# Patient Record
Sex: FEMALE | Race: WHITE | NOT HISPANIC OR LATINO | URBAN - METROPOLITAN AREA
[De-identification: names, ages, dates, MRNs, and addresses within clinical notes are randomized per-mention and may not be internally consistent; named-entity substitution may affect disease eponyms.]

---

## 2018-09-17 ENCOUNTER — OFFICE VISIT (OUTPATIENT)
Dept: URBAN - METROPOLITAN AREA CLINIC 45 | Facility: CLINIC | Age: 73
End: 2018-09-17
Payer: COMMERCIAL

## 2018-09-17 DIAGNOSIS — H35.371 PUCKERING OF MACULA, RIGHT EYE: Primary | ICD-10-CM

## 2018-09-17 PROCEDURE — 99213 OFFICE O/P EST LOW 20 MIN: CPT | Performed by: OPHTHALMOLOGY

## 2018-09-17 PROCEDURE — 92134 CPTRZ OPH DX IMG PST SGM RTA: CPT | Performed by: OPHTHALMOLOGY

## 2018-09-17 RX ORDER — OFLOXACIN 3 MG/ML
0.3 % SOLUTION/ DROPS OPHTHALMIC
Qty: 1 | Refills: 1 | Status: INACTIVE
Start: 2018-09-17 | End: 2019-02-12

## 2018-09-17 RX ORDER — PREDNISOLONE ACETATE 10 MG/ML
1 % SUSPENSION/ DROPS OPHTHALMIC
Qty: 10 | Refills: 2 | Status: INACTIVE
Start: 2018-09-17 | End: 2019-02-12

## 2018-09-17 ASSESSMENT — INTRAOCULAR PRESSURE
OD: 15
OS: 13

## 2018-09-17 NOTE — IMPRESSION/PLAN
Impression: Puckering of macula, right eye: H35.371. OD. Condition: established, worsening. Plan: OCT ordered and performed today. Discussed diagnosis with patient. The clinical exam was consistent with a visual significant Epiretinal membrane. The diagnosis and natural history and prognosis of Epiretinal membrane. Recommend sx OD, after a through discussion of surgical R/B/A. The patient understands the potential risks of sx, including (but not limited to) bleeding, pain, infection, loss of vision, loss of eye and possible need for more sx. The patient elects to proceed with sx, fully understanding that while the symptoms of visual distortion should improve, the final acuity which can take months to achieve may or may not be an improvement over baseline.  RL-2

## 2018-10-16 ENCOUNTER — SURGERY (OUTPATIENT)
Dept: URBAN - METROPOLITAN AREA SURGERY 15 | Facility: SURGERY | Age: 73
End: 2018-10-16
Payer: COMMERCIAL

## 2018-10-16 PROCEDURE — 67042 VIT FOR MACULAR HOLE: CPT | Performed by: OPHTHALMOLOGY

## 2018-10-17 ENCOUNTER — POST-OPERATIVE VISIT (OUTPATIENT)
Dept: URBAN - METROPOLITAN AREA CLINIC 45 | Facility: CLINIC | Age: 73
End: 2018-10-17

## 2018-10-17 ASSESSMENT — INTRAOCULAR PRESSURE
OD: 12
OS: 14

## 2018-10-23 ENCOUNTER — POST-OPERATIVE VISIT (OUTPATIENT)
Dept: URBAN - METROPOLITAN AREA CLINIC 33 | Facility: CLINIC | Age: 73
End: 2018-10-23

## 2018-10-23 PROCEDURE — 99024 POSTOP FOLLOW-UP VISIT: CPT | Performed by: OPHTHALMOLOGY

## 2018-10-23 ASSESSMENT — INTRAOCULAR PRESSURE
OS: 11
OD: 11

## 2018-11-20 ENCOUNTER — POST-OPERATIVE VISIT (OUTPATIENT)
Dept: URBAN - METROPOLITAN AREA CLINIC 33 | Facility: CLINIC | Age: 73
End: 2018-11-20
Payer: COMMERCIAL

## 2018-11-20 PROCEDURE — 99024 POSTOP FOLLOW-UP VISIT: CPT | Performed by: OPHTHALMOLOGY

## 2018-11-20 ASSESSMENT — INTRAOCULAR PRESSURE
OS: 14
OD: 16

## 2018-12-18 ENCOUNTER — POST-OPERATIVE VISIT (OUTPATIENT)
Dept: URBAN - METROPOLITAN AREA CLINIC 33 | Facility: CLINIC | Age: 73
End: 2018-12-18

## 2018-12-18 DIAGNOSIS — Z09 ENCNTR FOR F/U EXAM AFT TRTMT FOR COND OTH THAN MALIG NEOPLM: Primary | ICD-10-CM

## 2018-12-18 PROCEDURE — 99024 POSTOP FOLLOW-UP VISIT: CPT | Performed by: OPHTHALMOLOGY

## 2018-12-18 ASSESSMENT — INTRAOCULAR PRESSURE
OD: 14
OS: 14

## 2019-02-12 ENCOUNTER — OFFICE VISIT (OUTPATIENT)
Dept: URBAN - METROPOLITAN AREA CLINIC 33 | Facility: CLINIC | Age: 74
End: 2019-02-12
Payer: COMMERCIAL

## 2019-02-12 PROCEDURE — 92134 CPTRZ OPH DX IMG PST SGM RTA: CPT | Performed by: OPHTHALMOLOGY

## 2019-02-12 PROCEDURE — 99213 OFFICE O/P EST LOW 20 MIN: CPT | Performed by: OPHTHALMOLOGY

## 2019-02-12 ASSESSMENT — INTRAOCULAR PRESSURE
OS: 14
OD: 18

## 2019-02-22 ENCOUNTER — OFFICE VISIT (OUTPATIENT)
Dept: URBAN - METROPOLITAN AREA CLINIC 33 | Facility: CLINIC | Age: 74
End: 2019-02-22

## 2019-02-22 DIAGNOSIS — H52.4 PRESBYOPIA: Primary | ICD-10-CM

## 2019-02-22 PROCEDURE — 92012 INTRM OPH EXAM EST PATIENT: CPT | Performed by: OPTOMETRIST

## 2019-02-22 ASSESSMENT — INTRAOCULAR PRESSURE
OS: 13
OD: 15

## 2019-02-22 ASSESSMENT — VISUAL ACUITY
OS: 20/20
OD: 20/60

## 2019-02-22 NOTE — IMPRESSION/PLAN
Impression: Presbyopia: H52.4. Plan: Educated patient about today's findings. Order refraction to determine patient's best corrected visual acuity as it relates to presbyopia- dispensed Rx to patient. Patient was educated about 1-2 week adaptation period with new Rx. 

Decreased BCVA OD due to macular pucker OD

## 2019-06-19 ENCOUNTER — Encounter (OUTPATIENT)
Dept: URBAN - METROPOLITAN AREA CLINIC 71 | Facility: CLINIC | Age: 74
End: 2019-06-19
Payer: MEDICARE

## 2019-06-19 PROCEDURE — 92250 FUNDUS PHOTOGRAPHY W/I&R: CPT | Performed by: OPHTHALMOLOGY

## 2019-06-19 PROCEDURE — 99215 OFFICE O/P EST HI 40 MIN: CPT | Performed by: OPHTHALMOLOGY

## 2019-07-09 ENCOUNTER — Encounter (OUTPATIENT)
Dept: URBAN - METROPOLITAN AREA CLINIC 71 | Facility: CLINIC | Age: 74
End: 2019-07-09

## 2019-07-30 ENCOUNTER — OFFICE VISIT (OUTPATIENT)
Dept: URBAN - METROPOLITAN AREA CLINIC 33 | Facility: CLINIC | Age: 74
End: 2019-07-30
Payer: MEDICARE

## 2019-07-30 DIAGNOSIS — H35.81 RETINAL EDEMA: Primary | ICD-10-CM

## 2019-07-30 PROCEDURE — 92134 CPTRZ OPH DX IMG PST SGM RTA: CPT | Performed by: OPHTHALMOLOGY

## 2019-07-30 PROCEDURE — 99213 OFFICE O/P EST LOW 20 MIN: CPT | Performed by: OPHTHALMOLOGY

## 2019-07-30 ASSESSMENT — INTRAOCULAR PRESSURE
OD: 16
OS: 15

## 2019-07-30 NOTE — IMPRESSION/PLAN
Impression: Retinal edema: H35.81.
s/p PPVx W/ ERMx Plan: OCT ordered and performed today, Discussed diagnosis in detail with patient. Discussed treatment options with patient. Reassured patient of current condition and treatment. Will continue to observe condition and or symptoms. Call if [address] worsens.

## 2020-09-24 ENCOUNTER — OFFICE VISIT (OUTPATIENT)
Dept: URBAN - METROPOLITAN AREA CLINIC 71 | Facility: CLINIC | Age: 75
End: 2020-09-24
Payer: MEDICARE

## 2020-09-24 DIAGNOSIS — H00.15 CHALAZION LEFT LOWER EYELID: Primary | ICD-10-CM

## 2020-09-24 PROCEDURE — 92012 INTRM OPH EXAM EST PATIENT: CPT | Performed by: OPHTHALMOLOGY

## 2020-09-24 RX ORDER — TOBRAMYCIN AND DEXAMETHASONE 3; 1 MG/G; MG/G
OINTMENT OPHTHALMIC
Qty: 1 | Refills: 0 | Status: INACTIVE
Start: 2020-09-24 | End: 2020-09-24

## 2020-09-24 ASSESSMENT — INTRAOCULAR PRESSURE
OS: 21
OD: 20

## 2020-09-24 NOTE — IMPRESSION/PLAN
Impression: Chalazion left lower eyelid: H00.15. Plan: Will start patient on tobradex ointment BID for 10-14 days in left eye. Patient can also use warm compresses. Patient instructed to call if symptoms persist or worsen despite treatment.

## 2020-10-23 ENCOUNTER — OFFICE VISIT (OUTPATIENT)
Dept: URBAN - METROPOLITAN AREA CLINIC 33 | Facility: CLINIC | Age: 75
End: 2020-10-23
Payer: MEDICARE

## 2020-10-23 DIAGNOSIS — E11.9 TYPE 2 DIABETES MELLITUS W/O COMPLICATION: Primary | ICD-10-CM

## 2020-10-23 DIAGNOSIS — Z96.1 PRESENCE OF INTRAOCULAR LENS: ICD-10-CM

## 2020-10-23 PROCEDURE — 99214 OFFICE O/P EST MOD 30 MIN: CPT | Performed by: OPTOMETRIST

## 2020-10-23 ASSESSMENT — INTRAOCULAR PRESSURE
OD: 12
OS: 12

## 2020-10-23 ASSESSMENT — VISUAL ACUITY
OD: 20/40
OS: 20/25

## 2020-10-23 NOTE — IMPRESSION/PLAN
Impression: Type 2 diabetes mellitus w/o complication: C73.3. Plan: Diabetes w/o Complications: No signs of diabetic retinopathy noted. No treatment necessary at this time. Patient was instructed to monitor vision for sudden changes and to call if visual changes noted. Discussed ocular and systemic benefits of blood sugar control. Continue management with PCP. Letter sent to PCP regarding status of ocular health.

## 2020-10-30 ENCOUNTER — OFFICE VISIT (OUTPATIENT)
Dept: URBAN - METROPOLITAN AREA CLINIC 71 | Facility: CLINIC | Age: 75
End: 2020-10-30
Payer: MEDICARE

## 2020-10-30 DIAGNOSIS — H00.11 CHALAZION RIGHT UPPER EYELID: Primary | ICD-10-CM

## 2020-10-30 PROCEDURE — 92012 INTRM OPH EXAM EST PATIENT: CPT | Performed by: OPHTHALMOLOGY

## 2020-10-30 RX ORDER — ERYTHROMYCIN 5 MG/G
OINTMENT OPHTHALMIC
Qty: 1 | Refills: 0 | Status: INACTIVE
Start: 2020-10-30 | End: 2021-03-10

## 2020-10-30 RX ORDER — DOXYCYCLINE HYCLATE 100 MG/1
100 MG TABLET, COATED ORAL
Qty: 20 | Refills: 0 | Status: INACTIVE
Start: 2020-10-30 | End: 2021-03-10

## 2020-10-30 NOTE — IMPRESSION/PLAN
Impression: Chalazion right upper eyelid: H00.11. with erythema Plan: Discussed diagnosis with patient. Recommend patient to continue use of warm compresses. Will have patient start on doxycycline 100mg BID for 10 days 
and erythromycin OD at nighttime. 
Patient to return as needed if symptoms persist.

## 2021-03-10 ENCOUNTER — OFFICE VISIT (OUTPATIENT)
Dept: URBAN - METROPOLITAN AREA CLINIC 71 | Facility: CLINIC | Age: 76
End: 2021-03-10
Payer: MEDICARE

## 2021-03-10 DIAGNOSIS — H43.813 VITREOUS DEGENERATION, BILATERAL: Primary | ICD-10-CM

## 2021-03-10 DIAGNOSIS — H02.835 DERMATOCHALASIS OF LEFT LOWER EYELID: ICD-10-CM

## 2021-03-10 DIAGNOSIS — H25.813 COMBINED FORMS OF AGE-RELATED CATARACT, BILATERAL: ICD-10-CM

## 2021-03-10 PROCEDURE — 92014 COMPRE OPH EXAM EST PT 1/>: CPT | Performed by: OPHTHALMOLOGY

## 2021-03-10 ASSESSMENT — INTRAOCULAR PRESSURE
OD: 15
OS: 16

## 2021-03-10 NOTE — IMPRESSION/PLAN
Impression: Vitreous degeneration, bilateral: H43.813. Stable Plan: Discussed.  Will continue to monitor

## 2021-03-10 NOTE — IMPRESSION/PLAN
Impression: Dermatochalasis of right upper eyelid: H02.831. Plan: Discussed with patient. Recommend having consult with Dr Horace Garcia. Pt would like to wait about 6 months due to Abbie.

## 2021-03-10 NOTE — IMPRESSION/PLAN
Impression: Combined forms of age-related cataract, bilateral: H25.813. Plan: Discussed with patient. No surgical treatment recommended. Informed correcting with glasses will likely be adequate for the next few years.  Will continue to monitor

## 2021-03-11 ENCOUNTER — TESTING ONLY (OUTPATIENT)
Dept: URBAN - METROPOLITAN AREA CLINIC 71 | Facility: CLINIC | Age: 76
End: 2021-03-11

## 2021-03-11 DIAGNOSIS — H52.4 PRESBYOPIA: Primary | ICD-10-CM

## 2021-03-11 ASSESSMENT — VISUAL ACUITY
OD: 20/20
OS: 20/20

## 2021-03-11 ASSESSMENT — INTRAOCULAR PRESSURE
OD: 17
OS: 15

## 2021-03-11 NOTE — IMPRESSION/PLAN
Impression: Presbyopia: H52.4. Plan: Presbyopia is the inability to focus on objects (ie: accommodate) due to the loss of flexibility of your natural lens. Presbyopia occurs with age. Reading glasses, bifocals, trifocals or contacts can be helpful. Contact the office if difficulty focusing persists despite corrective eye wear. Optional glasses RX given today. slight change in RX compared to 2019 Refraction.

## 2021-09-08 ENCOUNTER — OFFICE VISIT (OUTPATIENT)
Dept: URBAN - METROPOLITAN AREA CLINIC 71 | Facility: CLINIC | Age: 76
End: 2021-09-08
Payer: MEDICARE

## 2021-09-08 DIAGNOSIS — Z41.1 ENCOUNTER FOR COSMETIC SURGERY: ICD-10-CM

## 2021-09-08 DIAGNOSIS — H02.831 DERMATOCHALASIS OF RIGHT UPPER EYELID: Primary | ICD-10-CM

## 2021-09-08 DIAGNOSIS — H02.834 DERMATOCHALASIS OF LEFT UPPER EYELID: ICD-10-CM

## 2021-09-08 PROCEDURE — 92285 EXTERNAL OCULAR PHOTOGRAPHY: CPT | Performed by: OPHTHALMOLOGY

## 2021-09-08 PROCEDURE — 92081 LIMITED VISUAL FIELD XM: CPT | Performed by: OPHTHALMOLOGY

## 2021-09-08 PROCEDURE — 99214 OFFICE O/P EST MOD 30 MIN: CPT | Performed by: OPHTHALMOLOGY

## 2021-09-08 NOTE — IMPRESSION/PLAN
Impression: Encounter for cosmetic surgery: Z41.1. Plan: The patient inquired about improving the appearance of bags and puffiness in the lower eyelids. We discussed cosmetic lower lid blepharoplasty w/ fat removal and repositioning. I explained the dual nature of lower lid bags, specifically the fact that I can improve the fat component via surgery, however the physiologic fluid fluctuation is not amenable to surgery, and will continue. Discussed the R/B/A of this procedure, all questions were answered. conservative fat excision and transposition.  may benefit from conservative skin pinch

## 2021-09-08 NOTE — IMPRESSION/PLAN
Impression: Dermatochalasis of right upper eyelid: H02.831. Plan: After review of the patient's photographs, visual fields, and examination, the severity of the condition is not sufficient to qualify for insurance coverage; see remainder of plan. The patient inquired about improving the appearance of excess upper eyelid skin and hooding. We discussed cosmetic upper eyelid blepharoplasty to reduce the excess/redundant skin in the upper eyelid region. R/B/A discussed. will need ELR OU in the future. patient prefers to wait until upper lids are low enough to qualify for insurance coverage.

## 2021-09-08 NOTE — IMPRESSION/PLAN
Impression: Dermatochalasis of left upper eyelid: H02.834. Plan: After review of the patient's photographs, visual fields, and examination, the severity of the condition is not sufficient to qualify for insurance coverage; see remainder of plan. The patient inquired about improving the appearance of excess upper eyelid skin and hooding. We discussed cosmetic upper eyelid blepharoplasty to reduce the excess/redundant skin in the upper eyelid region. R/B/A discussed. will need ELR OU in the future. patient prefers to wait until upper lids are low enough to qualify for insurance coverage.

## 2021-09-08 NOTE — IMPRESSION/PLAN
Impression: Encounter for cosmetic surgery: Z41.1. Plan: pt demonstrates eyebrow ptosis which contributes to upper eyelid hooding and we discussed surgical options including endobrow, direct brow, pretrichial brow lift, and internal brow lifting techniques. After review of these options, we selected cosmetic endoscopic brow lift as the best option given the patient's anatomy, desires, and means. We discussed the r/b/a of this procedure. Discussed risks of temporal nerve branch damage as well as hair loss in the area. Discussed hair color/bleaching treatments with the patient today, avoid for 3 weeks before and after brow lift surgery. Explained that lateral hooding at brow/lid junction will not improve without a brow lift.

## 2022-05-19 ENCOUNTER — OFFICE VISIT (OUTPATIENT)
Dept: URBAN - METROPOLITAN AREA CLINIC 71 | Facility: CLINIC | Age: 77
End: 2022-05-19
Payer: MEDICARE

## 2022-05-19 DIAGNOSIS — H43.813 VITREOUS DEGENERATION, BILATERAL: Primary | ICD-10-CM

## 2022-05-19 DIAGNOSIS — H25.813 COMBINED FORMS OF AGE-RELATED CATARACT, BILATERAL: ICD-10-CM

## 2022-05-19 PROCEDURE — 92014 COMPRE OPH EXAM EST PT 1/>: CPT | Performed by: OPHTHALMOLOGY

## 2022-05-19 PROCEDURE — 92134 CPTRZ OPH DX IMG PST SGM RTA: CPT | Performed by: OPHTHALMOLOGY

## 2022-05-19 ASSESSMENT — INTRAOCULAR PRESSURE
OD: 17
OS: 19

## 2022-05-19 NOTE — IMPRESSION/PLAN
Impression: Vitreous degeneration, bilateral: H43.813. Plan: OCT ordered, stable, no sign of any holes or tears noted. Discussed s/s of a retina; detachment, patient to monitor vision for any symptoms.

## 2023-10-01 PROBLEM — E06.3 HYPOTHYROIDISM DUE TO HASHIMOTO'S THYROIDITIS: Status: ACTIVE | Noted: 2023-10-01

## 2023-10-01 PROBLEM — E03.8 HYPOTHYROIDISM DUE TO HASHIMOTO'S THYROIDITIS: Status: ACTIVE | Noted: 2023-10-01

## 2023-10-01 PROBLEM — I10 ESSENTIAL HYPERTENSION: Status: ACTIVE | Noted: 2023-10-01

## 2023-10-26 ENCOUNTER — LAB SERVICES (OUTPATIENT)
Dept: LAB | Age: 78
End: 2023-10-26

## 2023-10-26 DIAGNOSIS — R53.83 FATIGUE, UNSPECIFIED TYPE: ICD-10-CM

## 2023-10-26 DIAGNOSIS — I10 ESSENTIAL HYPERTENSION: ICD-10-CM

## 2023-10-26 DIAGNOSIS — E06.3 HYPOTHYROIDISM DUE TO HASHIMOTO'S THYROIDITIS: ICD-10-CM

## 2023-10-26 DIAGNOSIS — E03.8 HYPOTHYROIDISM DUE TO HASHIMOTO'S THYROIDITIS: ICD-10-CM

## 2023-10-26 LAB
ALBUMIN SERPL-MCNC: 3.7 G/DL (ref 3.6–5.1)
ALBUMIN/GLOB SERPL: 1.2 {RATIO} (ref 1–2.4)
ALP SERPL-CCNC: 56 UNITS/L (ref 45–117)
ALT SERPL-CCNC: 49 UNITS/L
ANION GAP SERPL CALC-SCNC: 11 MMOL/L (ref 7–19)
AST SERPL-CCNC: 37 UNITS/L
BILIRUB SERPL-MCNC: 0.8 MG/DL (ref 0.2–1)
BUN SERPL-MCNC: 19 MG/DL (ref 6–20)
BUN/CREAT SERPL: 27 (ref 7–25)
CALCIUM SERPL-MCNC: 9.3 MG/DL (ref 8.4–10.2)
CHLORIDE SERPL-SCNC: 104 MMOL/L (ref 97–110)
CO2 SERPL-SCNC: 30 MMOL/L (ref 21–32)
CREAT SERPL-MCNC: 0.7 MG/DL (ref 0.51–0.95)
EGFRCR SERPLBLD CKD-EPI 2021: 90 ML/MIN/{1.73_M2}
FASTING DURATION TIME PATIENT: 0 HOURS (ref 0–999)
GLOBULIN SER-MCNC: 3.1 G/DL (ref 2–4)
GLUCOSE SERPL-MCNC: 58 MG/DL (ref 70–99)
POTASSIUM SERPL-SCNC: 3.8 MMOL/L (ref 3.4–5.1)
PROT SERPL-MCNC: 6.8 G/DL (ref 6.4–8.2)
SODIUM SERPL-SCNC: 141 MMOL/L (ref 135–145)
TSH SERPL-ACNC: 1.84 MCUNITS/ML (ref 0.35–5)

## 2023-10-26 PROCEDURE — 36415 COLL VENOUS BLD VENIPUNCTURE: CPT | Performed by: CLINICAL MEDICAL LABORATORY

## 2023-10-26 PROCEDURE — 80053 COMPREHEN METABOLIC PANEL: CPT | Performed by: CLINICAL MEDICAL LABORATORY

## 2023-10-26 PROCEDURE — 84443 ASSAY THYROID STIM HORMONE: CPT | Performed by: CLINICAL MEDICAL LABORATORY

## 2024-04-08 ENCOUNTER — APPOINTMENT (OUTPATIENT)
Dept: CT IMAGING | Age: 79
End: 2024-04-08
Attending: INTERNAL MEDICINE

## 2024-04-08 ENCOUNTER — TELEPHONE (OUTPATIENT)
Dept: CARDIOLOGY | Age: 79
End: 2024-04-08

## 2024-04-08 DIAGNOSIS — I10 HTN (HYPERTENSION): ICD-10-CM

## 2024-04-08 PROCEDURE — 75571 CT HRT W/O DYE W/CA TEST: CPT

## 2025-06-10 DIAGNOSIS — Z12.31 ENCOUNTER FOR SCREENING MAMMOGRAM FOR MALIGNANT NEOPLASM OF BREAST: Primary | ICD-10-CM

## 2025-07-08 DIAGNOSIS — I77.89 ASCENDING AORTA ENLARGEMENT (CMD): Primary | ICD-10-CM

## 2025-07-29 ENCOUNTER — HOSPITAL ENCOUNTER (OUTPATIENT)
Dept: CT IMAGING | Age: 80
Discharge: HOME | End: 2025-07-29
Attending: INTERNAL MEDICINE

## 2025-07-29 DIAGNOSIS — I77.89 ASCENDING AORTA ENLARGEMENT (CMD): ICD-10-CM

## 2025-07-29 PROCEDURE — 71250 CT THORAX DX C-: CPT
